# Patient Record
Sex: MALE | Race: WHITE | Employment: FULL TIME | URBAN - METROPOLITAN AREA
[De-identification: names, ages, dates, MRNs, and addresses within clinical notes are randomized per-mention and may not be internally consistent; named-entity substitution may affect disease eponyms.]

---

## 2018-09-24 ENCOUNTER — HOSPITAL ENCOUNTER (OUTPATIENT)
Dept: PSYCHIATRY | Age: 28
Setting detail: THERAPIES SERIES
Discharge: HOME | End: 2018-09-24
Payer: COMMERCIAL

## 2018-09-24 PROCEDURE — 80305 DRUG TEST PRSMV DIR OPT OBS: CPT

## 2018-09-24 PROCEDURE — 90834 PSYTX W PT 45 MINUTES: CPT

## 2018-10-01 ENCOUNTER — HOSPITAL ENCOUNTER (OUTPATIENT)
Dept: PSYCHIATRY | Age: 28
Setting detail: THERAPIES SERIES
Discharge: HOME | End: 2018-10-01
Payer: COMMERCIAL

## 2018-10-01 ENCOUNTER — APPOINTMENT (OUTPATIENT)
Dept: PSYCHIATRY | Age: 28
End: 2018-10-01
Payer: COMMERCIAL

## 2018-10-01 PROCEDURE — 90834 PSYTX W PT 45 MINUTES: CPT

## 2018-10-11 ENCOUNTER — HOSPITAL ENCOUNTER (OUTPATIENT)
Dept: PSYCHIATRY | Age: 28
Setting detail: THERAPIES SERIES
Discharge: HOME | End: 2018-10-11
Payer: COMMERCIAL

## 2018-10-11 PROCEDURE — 90853 GROUP PSYCHOTHERAPY: CPT

## 2018-10-15 ENCOUNTER — APPOINTMENT (OUTPATIENT)
Dept: PSYCHIATRY | Age: 28
End: 2018-10-15
Payer: COMMERCIAL

## 2018-10-18 ENCOUNTER — APPOINTMENT (OUTPATIENT)
Dept: PSYCHIATRY | Age: 28
End: 2018-10-18
Payer: COMMERCIAL

## 2018-10-22 ENCOUNTER — APPOINTMENT (OUTPATIENT)
Dept: PSYCHIATRY | Age: 28
End: 2018-10-22
Payer: COMMERCIAL

## 2018-10-23 ENCOUNTER — APPOINTMENT (OUTPATIENT)
Dept: PSYCHIATRY | Age: 28
End: 2018-10-23
Payer: COMMERCIAL

## 2018-10-25 ENCOUNTER — APPOINTMENT (OUTPATIENT)
Dept: PSYCHIATRY | Age: 28
End: 2018-10-25
Payer: COMMERCIAL

## 2018-10-29 ENCOUNTER — APPOINTMENT (OUTPATIENT)
Dept: PSYCHIATRY | Age: 28
End: 2018-10-29
Payer: COMMERCIAL

## 2018-10-30 ENCOUNTER — APPOINTMENT (OUTPATIENT)
Dept: PSYCHIATRY | Age: 28
End: 2018-10-30
Payer: COMMERCIAL

## 2020-03-10 ENCOUNTER — APPOINTMENT (OUTPATIENT)
Dept: GENERAL RADIOLOGY | Age: 30
DRG: 816 | End: 2020-03-10
Payer: COMMERCIAL

## 2020-03-10 ENCOUNTER — HOSPITAL ENCOUNTER (INPATIENT)
Age: 30
LOS: 1 days | Discharge: LEFT BEFORE DISCHARGE | DRG: 816 | End: 2020-03-11
Attending: EMERGENCY MEDICINE | Admitting: INTERNAL MEDICINE
Payer: COMMERCIAL

## 2020-03-10 LAB
BASOPHILS ABSOLUTE: 0 K/CU MM
BASOPHILS RELATIVE PERCENT: 0.2 % (ref 0–1)
DIFFERENTIAL TYPE: ABNORMAL
EOSINOPHILS ABSOLUTE: 0 K/CU MM
EOSINOPHILS RELATIVE PERCENT: 0.1 % (ref 0–3)
HCT VFR BLD CALC: 42.2 % (ref 42–52)
HEMOGLOBIN: 14 GM/DL (ref 13.5–18)
IMMATURE NEUTROPHIL %: 0.4 % (ref 0–0.43)
LACTATE: 1.9 MMOL/L (ref 0.4–2)
LYMPHOCYTES ABSOLUTE: 0.6 K/CU MM
LYMPHOCYTES RELATIVE PERCENT: 7 % (ref 24–44)
MCH RBC QN AUTO: 27.5 PG (ref 27–31)
MCHC RBC AUTO-ENTMCNC: 33.2 % (ref 32–36)
MCV RBC AUTO: 82.9 FL (ref 78–100)
MONOCYTES ABSOLUTE: 0.8 K/CU MM
MONOCYTES RELATIVE PERCENT: 9.3 % (ref 0–4)
NUCLEATED RBC %: 0 %
PDW BLD-RTO: 14.2 % (ref 11.7–14.9)
PLATELET # BLD: 189 K/CU MM (ref 140–440)
PMV BLD AUTO: 10.1 FL (ref 7.5–11.1)
RBC # BLD: 5.09 M/CU MM (ref 4.6–6.2)
SEGMENTED NEUTROPHILS ABSOLUTE COUNT: 7 K/CU MM
SEGMENTED NEUTROPHILS RELATIVE PERCENT: 83 % (ref 36–66)
TOTAL IMMATURE NEUTOROPHIL: 0.03 K/CU MM
TOTAL NUCLEATED RBC: 0 K/CU MM
WBC # BLD: 8.5 K/CU MM (ref 4–10.5)

## 2020-03-10 PROCEDURE — 83880 ASSAY OF NATRIURETIC PEPTIDE: CPT

## 2020-03-10 PROCEDURE — 84484 ASSAY OF TROPONIN QUANT: CPT

## 2020-03-10 PROCEDURE — 87633 RESP VIRUS 12-25 TARGETS: CPT

## 2020-03-10 PROCEDURE — 71045 X-RAY EXAM CHEST 1 VIEW: CPT

## 2020-03-10 PROCEDURE — 87798 DETECT AGENT NOS DNA AMP: CPT

## 2020-03-10 PROCEDURE — 6370000000 HC RX 637 (ALT 250 FOR IP): Performed by: EMERGENCY MEDICINE

## 2020-03-10 PROCEDURE — 87581 M.PNEUMON DNA AMP PROBE: CPT

## 2020-03-10 PROCEDURE — 83605 ASSAY OF LACTIC ACID: CPT

## 2020-03-10 PROCEDURE — 2580000003 HC RX 258: Performed by: EMERGENCY MEDICINE

## 2020-03-10 PROCEDURE — 80053 COMPREHEN METABOLIC PANEL: CPT

## 2020-03-10 PROCEDURE — 87040 BLOOD CULTURE FOR BACTERIA: CPT

## 2020-03-10 PROCEDURE — 85025 COMPLETE CBC W/AUTO DIFF WBC: CPT

## 2020-03-10 PROCEDURE — 6360000002 HC RX W HCPCS: Performed by: EMERGENCY MEDICINE

## 2020-03-10 PROCEDURE — 99291 CRITICAL CARE FIRST HOUR: CPT

## 2020-03-10 PROCEDURE — 93005 ELECTROCARDIOGRAM TRACING: CPT | Performed by: EMERGENCY MEDICINE

## 2020-03-10 PROCEDURE — 96375 TX/PRO/DX INJ NEW DRUG ADDON: CPT

## 2020-03-10 PROCEDURE — 87486 CHLMYD PNEUM DNA AMP PROBE: CPT

## 2020-03-10 PROCEDURE — 82805 BLOOD GASES W/O2 SATURATION: CPT

## 2020-03-10 RX ORDER — SODIUM CHLORIDE 9 MG/ML
INJECTION, SOLUTION INTRAVENOUS
Status: DISPENSED
Start: 2020-03-10 | End: 2020-03-11

## 2020-03-10 RX ORDER — 0.9 % SODIUM CHLORIDE 0.9 %
30 INTRAVENOUS SOLUTION INTRAVENOUS ONCE
Status: COMPLETED | OUTPATIENT
Start: 2020-03-10 | End: 2020-03-11

## 2020-03-10 RX ORDER — ACETAMINOPHEN 500 MG
1000 TABLET ORAL ONCE
Status: COMPLETED | OUTPATIENT
Start: 2020-03-10 | End: 2020-03-10

## 2020-03-10 RX ORDER — NALOXONE HYDROCHLORIDE 1 MG/ML
2 INJECTION INTRAMUSCULAR; INTRAVENOUS; SUBCUTANEOUS ONCE
Status: COMPLETED | OUTPATIENT
Start: 2020-03-10 | End: 2020-03-10

## 2020-03-10 RX ADMIN — NALOXONE HYDROCHLORIDE 2 MG: 1 INJECTION PARENTERAL at 23:05

## 2020-03-10 RX ADMIN — ACETAMINOPHEN 1000 MG: 500 TABLET ORAL at 23:06

## 2020-03-10 RX ADMIN — SODIUM CHLORIDE 2448 ML: 9 INJECTION, SOLUTION INTRAVENOUS at 23:05

## 2020-03-10 ASSESSMENT — PAIN SCALES - GENERAL: PAINLEVEL_OUTOF10: 0

## 2020-03-11 VITALS
SYSTOLIC BLOOD PRESSURE: 130 MMHG | DIASTOLIC BLOOD PRESSURE: 92 MMHG | OXYGEN SATURATION: 100 % | RESPIRATION RATE: 20 BRPM | HEART RATE: 71 BPM | TEMPERATURE: 98.6 F | HEIGHT: 67 IN | BODY MASS INDEX: 25.02 KG/M2 | WEIGHT: 159.39 LBS

## 2020-03-11 PROBLEM — T50.901A DRUG OVERDOSE, ACCIDENTAL OR UNINTENTIONAL, INITIAL ENCOUNTER: Status: ACTIVE | Noted: 2020-03-11

## 2020-03-11 LAB
ADENOVIRUS DETECTION BY PCR: NOT DETECTED
ALBUMIN SERPL-MCNC: 4.5 GM/DL (ref 3.4–5)
ALP BLD-CCNC: 43 IU/L (ref 40–129)
ALT SERPL-CCNC: 14 U/L (ref 10–40)
ANION GAP SERPL CALCULATED.3IONS-SCNC: 11 MMOL/L (ref 4–16)
ANION GAP SERPL CALCULATED.3IONS-SCNC: 9 MMOL/L (ref 4–16)
AST SERPL-CCNC: 27 IU/L (ref 15–37)
BASE EXCESS: ABNORMAL (ref 0–3.3)
BASOPHILS ABSOLUTE: 0 K/CU MM
BASOPHILS RELATIVE PERCENT: 0.3 % (ref 0–1)
BILIRUB SERPL-MCNC: 0.2 MG/DL (ref 0–1)
BORDETELLA PERTUSSIS PCR: NOT DETECTED
BUN BLDV-MCNC: 15 MG/DL (ref 6–23)
BUN BLDV-MCNC: 9 MG/DL (ref 6–23)
CALCIUM SERPL-MCNC: 8.1 MG/DL (ref 8.3–10.6)
CALCIUM SERPL-MCNC: 9.1 MG/DL (ref 8.3–10.6)
CHLAMYDOPHILA PNEUMONIA PCR: NOT DETECTED
CHLORIDE BLD-SCNC: 106 MMOL/L (ref 99–110)
CHLORIDE BLD-SCNC: 99 MMOL/L (ref 99–110)
CO2: 20 MMOL/L (ref 21–32)
CO2: 23 MMOL/L (ref 21–32)
COMMENT: ABNORMAL
CORONAVIRUS 229E PCR: NOT DETECTED
CORONAVIRUS HKU1 PCR: NOT DETECTED
CORONAVIRUS NL63 PCR: NOT DETECTED
CORONAVIRUS OC43 PCR: NOT DETECTED
CREAT SERPL-MCNC: 1.1 MG/DL (ref 0.9–1.3)
CREAT SERPL-MCNC: 1.2 MG/DL (ref 0.9–1.3)
DIFFERENTIAL TYPE: ABNORMAL
EOSINOPHILS ABSOLUTE: 0 K/CU MM
EOSINOPHILS RELATIVE PERCENT: 0.2 % (ref 0–3)
GFR AFRICAN AMERICAN: >60 ML/MIN/1.73M2
GFR AFRICAN AMERICAN: >60 ML/MIN/1.73M2
GFR NON-AFRICAN AMERICAN: >60 ML/MIN/1.73M2
GFR NON-AFRICAN AMERICAN: >60 ML/MIN/1.73M2
GLUCOSE BLD-MCNC: 93 MG/DL (ref 70–99)
GLUCOSE BLD-MCNC: 98 MG/DL (ref 70–99)
HCO3 VENOUS: 21.5 MMOL/L (ref 19–25)
HCT VFR BLD CALC: 39 % (ref 42–52)
HEMOGLOBIN: 12.7 GM/DL (ref 13.5–18)
HUMAN METAPNEUMOVIRUS PCR: NOT DETECTED
IMMATURE NEUTROPHIL %: 0.2 % (ref 0–0.43)
INFLUENZA A BY PCR: NOT DETECTED
INFLUENZA A H1 (2009) PCR: NOT DETECTED
INFLUENZA A H1 PANDEMIC PCR: NOT DETECTED
INFLUENZA A H3 PCR: NOT DETECTED
INFLUENZA B BY PCR: NOT DETECTED
LYMPHOCYTES ABSOLUTE: 1.2 K/CU MM
LYMPHOCYTES RELATIVE PERCENT: 13.9 % (ref 24–44)
MCH RBC QN AUTO: 27.5 PG (ref 27–31)
MCHC RBC AUTO-ENTMCNC: 32.6 % (ref 32–36)
MCV RBC AUTO: 84.4 FL (ref 78–100)
MONOCYTES ABSOLUTE: 1.3 K/CU MM
MONOCYTES RELATIVE PERCENT: 14.1 % (ref 0–4)
MYCOPLASMA PNEUMONIAE PCR: NOT DETECTED
NUCLEATED RBC %: 0 %
O2 SAT, VEN: 94.4 % (ref 50–70)
PARAINFLUENZA 1 PCR: NOT DETECTED
PARAINFLUENZA 2 PCR: NOT DETECTED
PARAINFLUENZA 3 PCR: NOT DETECTED
PARAINFLUENZA 4 PCR: NOT DETECTED
PCO2, VEN: 39 MMHG (ref 38–52)
PDW BLD-RTO: 14.6 % (ref 11.7–14.9)
PH VENOUS: 7.35 (ref 7.32–7.42)
PLATELET # BLD: 167 K/CU MM (ref 140–440)
PMV BLD AUTO: 10.5 FL (ref 7.5–11.1)
PO2, VEN: 160 MMHG (ref 28–48)
POTASSIUM SERPL-SCNC: 4 MMOL/L (ref 3.5–5.1)
POTASSIUM SERPL-SCNC: 4.4 MMOL/L (ref 3.5–5.1)
PRO-BNP: 24.22 PG/ML
RBC # BLD: 4.62 M/CU MM (ref 4.6–6.2)
RHINOVIRUS ENTEROVIRUS PCR: NOT DETECTED
RSV PCR: NOT DETECTED
SEGMENTED NEUTROPHILS ABSOLUTE COUNT: 6.3 K/CU MM
SEGMENTED NEUTROPHILS RELATIVE PERCENT: 71.3 % (ref 36–66)
SODIUM BLD-SCNC: 133 MMOL/L (ref 135–145)
SODIUM BLD-SCNC: 135 MMOL/L (ref 135–145)
TOTAL IMMATURE NEUTOROPHIL: 0.02 K/CU MM
TOTAL NUCLEATED RBC: 0 K/CU MM
TOTAL PROTEIN: 7.1 GM/DL (ref 6.4–8.2)
TOTAL RETICULOCYTE COUNT: 0.03 K/CU MM
TROPONIN T: <0.01 NG/ML
WBC # BLD: 8.8 K/CU MM (ref 4–10.5)

## 2020-03-11 PROCEDURE — 6360000002 HC RX W HCPCS: Performed by: INTERNAL MEDICINE

## 2020-03-11 PROCEDURE — 82805 BLOOD GASES W/O2 SATURATION: CPT

## 2020-03-11 PROCEDURE — 93010 ELECTROCARDIOGRAM REPORT: CPT | Performed by: INTERNAL MEDICINE

## 2020-03-11 PROCEDURE — 96368 THER/DIAG CONCURRENT INF: CPT

## 2020-03-11 PROCEDURE — 6360000002 HC RX W HCPCS: Performed by: EMERGENCY MEDICINE

## 2020-03-11 PROCEDURE — 80048 BASIC METABOLIC PNL TOTAL CA: CPT

## 2020-03-11 PROCEDURE — 87040 BLOOD CULTURE FOR BACTERIA: CPT

## 2020-03-11 PROCEDURE — 96375 TX/PRO/DX INJ NEW DRUG ADDON: CPT

## 2020-03-11 PROCEDURE — 87081 CULTURE SCREEN ONLY: CPT

## 2020-03-11 PROCEDURE — 96366 THER/PROPH/DIAG IV INF ADDON: CPT

## 2020-03-11 PROCEDURE — 96365 THER/PROPH/DIAG IV INF INIT: CPT

## 2020-03-11 PROCEDURE — 85025 COMPLETE CBC W/AUTO DIFF WBC: CPT

## 2020-03-11 PROCEDURE — 87449 NOS EACH ORGANISM AG IA: CPT

## 2020-03-11 PROCEDURE — 2580000003 HC RX 258: Performed by: INTERNAL MEDICINE

## 2020-03-11 PROCEDURE — 36415 COLL VENOUS BLD VENIPUNCTURE: CPT

## 2020-03-11 PROCEDURE — 96376 TX/PRO/DX INJ SAME DRUG ADON: CPT

## 2020-03-11 PROCEDURE — 2060000000 HC ICU INTERMEDIATE R&B

## 2020-03-11 PROCEDURE — 2580000003 HC RX 258: Performed by: EMERGENCY MEDICINE

## 2020-03-11 PROCEDURE — 96367 TX/PROPH/DG ADDL SEQ IV INF: CPT

## 2020-03-11 RX ORDER — NALOXONE HYDROCHLORIDE 1 MG/ML
2 INJECTION INTRAMUSCULAR; INTRAVENOUS; SUBCUTANEOUS ONCE
Status: COMPLETED | OUTPATIENT
Start: 2020-03-11 | End: 2020-03-11

## 2020-03-11 RX ORDER — ONDANSETRON 2 MG/ML
4 INJECTION INTRAMUSCULAR; INTRAVENOUS EVERY 6 HOURS PRN
Status: DISCONTINUED | OUTPATIENT
Start: 2020-03-11 | End: 2020-03-11 | Stop reason: HOSPADM

## 2020-03-11 RX ORDER — ACETAMINOPHEN 650 MG/1
650 SUPPOSITORY RECTAL EVERY 6 HOURS PRN
Status: DISCONTINUED | OUTPATIENT
Start: 2020-03-11 | End: 2020-03-11 | Stop reason: HOSPADM

## 2020-03-11 RX ORDER — ACETAMINOPHEN 325 MG/1
650 TABLET ORAL EVERY 6 HOURS PRN
Status: DISCONTINUED | OUTPATIENT
Start: 2020-03-11 | End: 2020-03-11 | Stop reason: HOSPADM

## 2020-03-11 RX ORDER — ONDANSETRON 2 MG/ML
4 INJECTION INTRAMUSCULAR; INTRAVENOUS EVERY 6 HOURS PRN
Status: DISCONTINUED | OUTPATIENT
Start: 2020-03-11 | End: 2020-03-11 | Stop reason: SDUPTHER

## 2020-03-11 RX ORDER — PROMETHAZINE HYDROCHLORIDE 25 MG/1
12.5 TABLET ORAL EVERY 6 HOURS PRN
Status: DISCONTINUED | OUTPATIENT
Start: 2020-03-11 | End: 2020-03-11 | Stop reason: HOSPADM

## 2020-03-11 RX ORDER — POLYETHYLENE GLYCOL 3350 17 G/17G
17 POWDER, FOR SOLUTION ORAL DAILY PRN
Status: DISCONTINUED | OUTPATIENT
Start: 2020-03-11 | End: 2020-03-11 | Stop reason: HOSPADM

## 2020-03-11 RX ORDER — SODIUM CHLORIDE 9 MG/ML
INJECTION, SOLUTION INTRAVENOUS CONTINUOUS
Status: DISCONTINUED | OUTPATIENT
Start: 2020-03-11 | End: 2020-03-11 | Stop reason: HOSPADM

## 2020-03-11 RX ORDER — SODIUM CHLORIDE 0.9 % (FLUSH) 0.9 %
10 SYRINGE (ML) INJECTION PRN
Status: DISCONTINUED | OUTPATIENT
Start: 2020-03-11 | End: 2020-03-11 | Stop reason: HOSPADM

## 2020-03-11 RX ORDER — SODIUM CHLORIDE 0.9 % (FLUSH) 0.9 %
10 SYRINGE (ML) INJECTION EVERY 12 HOURS SCHEDULED
Status: DISCONTINUED | OUTPATIENT
Start: 2020-03-11 | End: 2020-03-11 | Stop reason: HOSPADM

## 2020-03-11 RX ADMIN — NALOXONE HYDROCHLORIDE 1 MG/HR: 1 INJECTION PARENTERAL at 07:01

## 2020-03-11 RX ADMIN — AZITHROMYCIN MONOHYDRATE 500 MG: 500 INJECTION, POWDER, LYOPHILIZED, FOR SOLUTION INTRAVENOUS at 00:46

## 2020-03-11 RX ADMIN — VANCOMYCIN HYDROCHLORIDE 1250 MG: 5 INJECTION, POWDER, LYOPHILIZED, FOR SOLUTION INTRAVENOUS at 01:06

## 2020-03-11 RX ADMIN — NALOXONE HYDROCHLORIDE 1 MG/HR: 1 INJECTION PARENTERAL at 01:19

## 2020-03-11 RX ADMIN — SODIUM CHLORIDE: 9 INJECTION, SOLUTION INTRAVENOUS at 05:54

## 2020-03-11 RX ADMIN — ONDANSETRON 4 MG: 2 INJECTION INTRAMUSCULAR; INTRAVENOUS at 01:15

## 2020-03-11 RX ADMIN — NALOXONE HYDROCHLORIDE 2 MG: 1 INJECTION PARENTERAL at 00:24

## 2020-03-11 RX ADMIN — CEFEPIME 2 G: 2 INJECTION, POWDER, FOR SOLUTION INTRAVENOUS at 00:21

## 2020-03-11 RX ADMIN — VANCOMYCIN HYDROCHLORIDE 1250 MG: 5 INJECTION, POWDER, LYOPHILIZED, FOR SOLUTION INTRAVENOUS at 12:36

## 2020-03-11 RX ADMIN — NALOXONE HYDROCHLORIDE 1 MG/HR: 1 INJECTION PARENTERAL at 11:13

## 2020-03-11 ASSESSMENT — PAIN SCALES - GENERAL
PAINLEVEL_OUTOF10: 0

## 2020-03-11 NOTE — ED NOTES
Bed: ED-14  Expected date:   Expected time:   Means of arrival:   Comments:  Moving MA from room [address] [facility] Zac David Gallegos RN  03/11/20 9281

## 2020-03-11 NOTE — ED NOTES
Patient placed on cardiac monitor and 2L of oxygen. Patient oxygen decreased to 88% while sleeping.      Dwain Vela RN  03/10/20 0106

## 2020-03-11 NOTE — ED NOTES
Patient from home with accidental over dose. EMS gave 6 MG IV and 2 MG IM in route vital signs stable respirations even and unlabored.       Dioni Whaley RN  03/10/20 [id number]

## 2020-03-11 NOTE — H&P
HISTORY AND PHYSICAL  (Hospitalist, Internal Medicine)  IDENTIFYING INFORMATION   PATIENT:  Andrew Juarez  MRN:  [id number]  ADMIT DATE: 3/10/2020      CHIEF COMPLAINT   Drug overdose    HISTORY OF PRESENT ILLNESS   Andrew Juarez is a 30 y.o. male with no significant past medical history, polysubstance abuse was brought to ER by EMS after the patient was found by his girlfriend. Patient was drowsy, unable to get much information from him. Patient knew that he was in the hospital, name of the hospital, year, month, date. Entire information was from patient's girlfriend at bedside. She states that she found the patient with agonal breathing when she returned from work. She said that the patient was using cocaine and heroin. She reports that he was doing heroin in the past and she was not aware that he started using it again. Patient had multiple incidents of overdose in the past.  Patient currently denied any complaints-no chest pain, shortness of breath, abdominal pain, denied any diarrhea. Patient was noted to be saturating 88% and was placed on supplemental oxygen. Patient was saturating 99% on 4 L of oxygen at the time of my interview. Patient received 2 mg of IM, 6 mg of IV Narcan by EMS with improvement in his mental status. Patient's girlfriend also stated that patient was having cough with productive sputum. At presentation patient was noted to have /106, , RR 20, temperature 100.6, saturating 94%. As per ER physician patient was requiring frequent doses of Narcan and hence a continuous infusion was started. Patient was also noted to have epistaxis in the ER which resolved. Labs within normal limits. Chest x-ray-reported as streaky bilateral infrahilar airspace opacities, which could represent an atypical or viral pneumonitis. Patient received vancomycin, cefepime, azithromycin.     PAST MEDICAL HISTORY PAST SURGICAL HISTORY   None significant  none   FAMILY HISTORY SOCIAL HISTORY    Unknown as per patient's girlfriend   patient smokes cigarettes-half pack per day, denies any alcohol, cocaine and heroin abuse   MEDICATIONS ALLERGIES    None   no known drug allergies       PAST MEDICAL, SURGICAL, FAMILY, and SOCIAL HISTORY         No past medical history on file. No past surgical history on file. No family history on file. Family Hx of HTN  Family Hx as reviewed above, otherwise non-contributory  Social History     Socioeconomic History    Marital status: Single     Spouse name: Not on file    Number of children: Not on file    Years of education: Not on file    Highest education level: Not on file   Occupational History    Not on file   Social Needs    Financial resource strain: Not on file    Food insecurity     Worry: Not on file     Inability: Not on file    Transportation needs     Medical: Not on file     Non-medical: Not on file   Tobacco Use    Smoking status: Current Every Day Smoker   Substance and Sexual Activity    Alcohol use: Yes    Drug use: Not on file     Comment:  heroin    Sexual activity: Not on file   Lifestyle    Physical activity     Days per week: Not on file     Minutes per session: Not on file    Stress: Not on file   Relationships    Social connections     Talks on phone: Not on file     Gets together: Not on file     Attends [employer] service: Not on file     Active member of club or organization: Not on file     Attends meetings of clubs or organizations: Not on file     Relationship status: Not on file    Intimate partner violence     Fear of current or ex partner: Not on file     Emotionally abused: Not on file     Physically abused: Not on file     Forced sexual activity: Not on file   Other Topics Concern    Not on file   Social History Narrative    Not on file       MEDICATIONS   Medications Prior to Admission  Not in a hospital admission.     Current Medications  Current Facility-Administered Medications   Medication Dose Route Frequency Provider Last Rate Last Dose    naloxone (NARCAN) 2 mg in sodium chloride 0.9 % 500 mL infusion  1 mg/hr Intravenous Continuous Vianney Carreon  mL/hr at 03/11/20 0119 1 mg/hr at 03/11/20 0119    ondansetron (ZOFRAN) injection 4 mg  4 mg Intravenous Q6H PRN Vianney Carreon MD   4 mg at 03/11/20 0115     Current Outpatient Medications   Medication Sig Dispense Refill    ondansetron (ZOFRAN) 4 MG tablet Take 1 tablet by mouth every 8 hours as needed for Nausea or Vomiting 30 tablet 0         Allergies  No Known Allergies    REVIEW OF SYSTEMS   Within above limitations. 14 point review of systems reviewed. Pertinent positive or negative as per HPI or otherwise negative per 14 point systems review. PHYSICAL EXAM     Wt Readings from Last 3 Encounters:   03/11/20 180 lb (81.6 kg)   09/06/16 170 lb (77.1 kg)   09/05/16 170 lb (77.1 kg)       Blood pressure 113/79, pulse 108, temperature 100.6 °F (38.1 °C), temperature source Axillary, resp. rate 16, height 5' 8\" (1.727 m), weight 180 lb (81.6 kg), SpO2 100 %. GEN  -drowsy, arousable, answering questions appropriately  EYES   -PERRL. HENT  -MM are moist.  Clotted blood in bilateral nares. RESP  -LS CTA equal bilat, no wheezes, rales or rhonchi. Symmetric chest movement. No respiratory distress noted. C/V  -S1/S2 auscultated, tachycardia without appreciable M/R/G. No JVD or carotid bruits. Peripheral pulses equal bilaterally and palpable. No peripheral edema. No reproducible chest wall tenderness. GI  -Abdomen is soft non-distended, no significant tenderness. No masses or guarding. + BS in all quadrants. Rectal exam deferred.   -No CVA tenderness. Ochoa catheter is not present. MS  -B/L extremities strong muscles strength. Full movements. No gross joint deformities. No swelling, intact sensation symmetrical.   SKIN  -Normal coloration, warm, dry. NEURO  -CN 2-12 appear grossly intact, normal speech, no lateralizing weakness.   PSYC-  drowsy, arousable, oriented x 4. Appropriate affect. LABS AND IMAGING     Results for Marcus Hill (MRN [id number]) as of 3/11/2020 04:41   Ref.  Range 3/10/2020 23:20   Sodium Latest Ref Range: 135 - 145 MMOL/L 133 (L)   Potassium Latest Ref Range: 3.5 - 5.1 MMOL/L 4.0   Chloride Latest Ref Range: 99 - 110 mMol/L 99   CO2 Latest Ref Range: 21 - 32 MMOL/L 23   BUN Latest Ref Range: 6 - 23 MG/DL 15   Creatinine Latest Ref Range: 0.9 - 1.3 MG/DL 1.2   Anion Gap Latest Ref Range: 4 - 16  11   GFR Non- Latest Ref Range: >60 mL/min/1.73m2 >60   GFR African American Latest Ref Range: >60 mL/min/1.73m2 >60   Lactate, ser/plas Latest Ref Range: 0.4 - 2.0 mMOL/L 1.9   Glucose Latest Ref Range: 70 - 99 MG/DL 98   Calcium Latest Ref Range: 8.3 - 10.6 MG/DL 9.1   Total Protein Latest Ref Range: 6.4 - 8.2 GM/DL 7.1   Pro-BNP Latest Ref Range: <300 PG/ML 24.22   Troponin T Latest Ref Range: <0.01 NG/ML <0.010   Albumin Latest Ref Range: 3.4 - 5.0 GM/DL 4.5   Alk Phos Latest Ref Range: 40 - 129 IU/L 43   ALT Latest Ref Range: 10 - 40 U/L 14   AST Latest Ref Range: 15 - 37 IU/L 27   Bilirubin Latest Ref Range: 0.0 - 1.0 MG/DL 0.2   WBC Latest Ref Range: 4.0 - 10.5 K/CU MM 8.5   RBC Latest Ref Range: 4.6 - 6.2 M/CU MM 5.09   Hemoglobin Quant Latest Ref Range: 13.5 - 18.0 GM/DL 14.0   Hematocrit Latest Ref Range: 42 - 52 % 42.2   MCV Latest Ref Range: 78 - 100 FL 82.9   MCH Latest Ref Range: 27 - 31 PG 27.5   MCHC Latest Ref Range: 32.0 - 36.0 % 33.2   MPV Latest Ref Range: 7.5 - 11.1 FL 10.1   RDW Latest Ref Range: 11.7 - 14.9 % 14.2   Platelet Count Latest Ref Range: 140 - 440 K/CU    Lymphocyte % Latest Ref Range: 24 - 44 % 7.0 (L)   Monocytes % Latest Ref Range: 0 - 4 % 9.3 (H)   Eosinophils % Latest Ref Range: 0 - 3 % 0.1   Basophils % Latest Ref Range: 0 - 1 % 0.2   Lymphocytes Absolute Latest Units: K/CU MM 0.6   Monocytes Absolute Latest Units: K/CU MM 0.8   Eosinophils Absolute Latest Units: K/CU MM Order Status: Completed Updated: 03/11/20 0002     Impression:       Streaky, bilateral infrahilar airspace opacities, which could represent an  atypical or viral pneumonitis.  Follow-up recommended to assure resolution. Relevant labs and imaging reviewed    ASSESSMENT AND PLAN     1. Acute hypoxic respiratory failure with hypoxia:  -patient was saturating 88% as per ER documentation  - patient was saturating 99% on 4 L of oxygen during my interview.   -Chest x-ray reported as streaky bilateral infrahilar airspace opacities which could represent an atypical or viral pneumonitis.  -Patient also had low-grade fever at 100.6.  -Continue oxygen supplementation and wean as tolerated. -Continue treatment for pneumonia.  -Respiratory panel negative. -Urine Legionella, Streptococcus pneumonia antigen ordered. Sputum culture ordered.  -Blood cultures ordered in the ER.  -Consult pulmonology as needed. 2.  Accidental drug overdose:  -Patient does use IV heroin and cocaine.  -Patient currently on Narcan drip.  -Reevaluate in the morning. 3.  Epistaxis: Resolved    DVT Prophylaxis:lovenox  GI Prophylaxis: not indicated  Code Status: FULL.       Case d/w ED physician    Virginia Harrison MD  Hospitalist, Internal Medicine  3/11/2020 at 3:30 AM

## 2020-03-11 NOTE — ED PROVIDER NOTES
induration. No underlying fluctuance. Erythema does not involve the joints. Eye:  Extraocular movements intact. Ears, nose, mouth and throat:  Oral mucosa moist   Neck:  Trachea midline. Extremity:  No swelling. Normal ROM     Heart: Tachycardic but regular, normal S1 & S2, no extra heart sounds. No murmur heard. Perfusion:  Intact. Bounding peripheral pulses. Respiratory: Coarse breath sounds bilateral bases. Mild bradypnea. Speaking in short sentences. No overt respiratory distress. Protecting airway. Abdominal:  Normal bowel sounds. Soft. Nontender. Non distended. Back:  No CVA tenderness to palpation     Neurological:  Alert but drowsy. Intermittently answering questions. Patient does follow commands.           Psychiatric:  Appropriate    I have reviewed and interpreted all of the currently available lab results from this visit (if applicable):  Results for orders placed or performed during the hospital encounter of 03/10/20   RESP Disease Panel PCR   Result Value Ref Range    Adenovirus Detection by PCR NOT DETECTED NOT DETECTED    Coronavirus 229E PCR NOT DETECTED NOT DETECTED    Coronavirus HKU1 PCR NOT DETECTED NOT DETECTED    Coronavirus NL63 PCR NOT DETECTED NOT DETECTED    Coronavirus OC43 PCR NOT DETECTED NOT DETECTED    Human Metapneumovirus PCR NOT DETECTED NOT DETECTED    Rhinovirus Enterovirus PCR NOT DETECTED NOT DETECTED    Influenza A by PCR NOT DETECTED NOT DETECTED    Influenza A H1 (2009) PCR NOT DETECTED NOT DETECTED    Influenza A H1 Pandemic PCR NOT DETECTED NOT DETECTED    Influenza A H3 PCR NOT DETECTED NOT DETECTED    Influenza B by PCR NOT DETECTED NOT DETECTED    Parainfluenza 1 PCR NOT DETECTED NOT DETECTED    Parainfluenza 2 PCR NOT DETECTED NOT DETECTED    Parainfluenza 3 PCR NOT DETECTED NOT DETECTED    Parainfluenza 4 PCR NOT DETECTED NOT DETECTED    RSV PCR NOT DETECTED NOT DETECTED    B Pertussis by PCR NOT DETECTED NOT DETECTED    Chlamydophila Pneumonia R Axis 73 degrees    T Axis 34 degrees    Diagnosis       Sinus tachycardia  Otherwise normal ECG  No previous ECGs available        Radiographs (if obtained):  [] The following radiograph was interpreted by myself in the absence of a radiologist:   [x] Radiologist's Report Reviewed:  XR CHEST PORTABLE   Preliminary Result   Streaky, bilateral infrahilar airspace opacities, which could represent an   atypical or viral pneumonitis. Follow-up recommended to assure resolution. EKG (if obtained): (All EKG's are interpreted by myself in the absence of a cardiologist)    Chart review shows recent radiographs:  Xr Chest Portable    Result Date: 3/11/2020  Streaky, bilateral infrahilar airspace opacities, which could represent an atypical or viral pneumonitis. Follow-up recommended to assure resolution. MDM:  Pt presents as above. Emergent conditions considered. Presentation prompted initial labs, EKG and imaging. EKG with sinus tachycardia as above. CBC and CMP without clinically significant derangement. Troponin negative. BNP is not suggestive of volume overload. Chest x-ray is demonstrating streaky bilateral airspace opacities. Blood cultures sent. Lactate is not suggestive of significant tissue hypoperfusion. Respiratory disease panel is negative. Patient is receiving IV fluids of 30 cc/kg bolus on arrival.  Oral Tylenol was given. IV antibiotics of broad-spectrum were initiated of cefepime, vancomycin and azithromycin given patient's IV drug abuse. Patient is requiring repeated doses of Narcan boluses in the emergency department and a Narcan infusion was initiated. Patient is easily arousable on reassessment at 0130 and is verbal.  Patient is protecting his airway does not need to be intubated. Patient clearly benefit from admission until he is off of oxygen and not requiring further Narcan.   Patient is to be discussed with hospitalist team and patient is to be admitted to

## 2020-03-11 NOTE — ED NOTES
Patient found vomiting in trash can beside of bed. Bright blood coming form nose. Face washed with towel.      Millie Joseph RN  03/11/20 010

## 2020-03-11 NOTE — PROGRESS NOTES
RENAL DOSE ADJUSTMENT MADE PER P/T PROTOCOL    PREVIOUS ORDER:  Cefepime 1g q8h IVPB     Estimated Creatinine Clearance: 83 mL/min (based on SCr of 1.2 mg/dL).   Recent Labs     03/10/20  2320   BUN 15   CREATININE 1.2        NEW RENALLY ADJUSTED ORDER:  Cefepime 2g q8h IVPB recommended dose for coverage of pneumonia with CrCl >60     CECILLE Dale [facility]  3/11/2020 8:48 AM  __________________________________________________

## 2020-03-11 NOTE — PROGRESS NOTES
[facility] HOSPITALIST PROGRESS NOTE  Date: 3/11/2020   Name: Harland Denver   MRN: [id number]   YOB: 1990  Chief Complaint   Patient presents with    Drug Overdose     6mg IV and 2mg IM narcan given         Subjective/Interval Hx:     -OD yesterday night around 7 or 8pm      ROS: negative unless mentioned above  Objective:   Physical Exam:   /68   Pulse 71   Temp 98 °F (36.7 °C) (Oral)   Resp 20   Ht 5' 7\" (1.702 m)   Wt 159 lb 6.3 oz (72.3 kg)   SpO2 100%   BMI 24.96 kg/m²   CONSTITUTIONAL:  No acute distress, sleeping, can be aroused  HENT:  NCAT  LUNGS:  cta b/l bs+  CARDIOVASCULAR:  s1s2 rrr  ABDOMEN:  Soft nt nd  MUSCULOSKELETAL/Extremities:  No edema, nontender  NEUROLOGIC:  Able to be arouse, but quickly falls asleep  SKIN:  May have track yony in right arm    Assessment/Plan:       1. Acute hypoxic respiratory failure with hypoxia, possibly due to aspiration pneumonia  patient was saturating 88% as per ER documentation  Chest x-ray reported as streaky bilateral infrahilar airspace opacities which could represent an atypical or viral pneumonitis. Patient also had low-grade fever at 100.6. Continue oxygen supplementation and wean as tolerated. Continue abx for pneumonia. Respiratory panel negative. Urine Legionella, Streptococcus pneumonia antigen ordered. Sputum culture ordered. Blood cultures ordered in the ER. Consult pulmonology as needed.  -On 2 L nasal cannula. Breathing appears stable.     2. Accidental drug overdose:  Patient does use IV heroin and cocaine. Patient currently on Narcan drip. - Appears improved. Stop Narcan drip.     3.  Epistaxis: Resolved      DVT Prophylaxis: Lovenox  Diet: Diet NPO Effective Now  Home O2: None  Code Status: Full Code      Dispo:  -Patient is planning on leaving [place].     Masha Chao MD  3/11/2020    Meds:   Meds:    sodium chloride flush  10 mL Intravenous 2 times per day    enoxaparin  40 mg Subcutaneous Daily    vancomycin 1,250 mg Intravenous Q12H    cefepime  2 g Intravenous Q8H      Infusions:    naloxone (NARCAN) infusion 1 mg/hr (03/11/20 0701)    sodium chloride 125 mL/hr at 03/11/20 0554     PRN Meds: sodium chloride flush, 10 mL, PRN  acetaminophen, 650 mg, Q6H PRN    Or  acetaminophen, 650 mg, Q6H PRN  polyethylene glycol, 17 g, Daily PRN  promethazine, 12.5 mg, Q6H PRN    Or  ondansetron, 4 mg, Q6H PRN        Data/Labs:     Recent Labs     03/10/20  2320 03/11/20  0819   WBC 8.5 8.8   HGB 14.0 12.7*   HCT 42.2 39.0*    167      Recent Labs     03/10/20  2320   *   K 4.0   CL 99   CO2 23   BUN 15   CREATININE 1.2     Recent Labs     03/10/20  2320   AST 27   ALT 14   BILITOT 0.2   ALKPHOS 43     No results for input(s): INR in the last 72 hours. Recent Labs     03/10/20  2320   TROPONINT <0.010     HgBA1c: No results found for: LABA1C  CALCIUM:  9.1/23 (03/10 2320)    No intake/output data recorded.   No intake or output data in the 24 hours ending 03/11/20 0917

## 2020-03-12 LAB
CULTURE: NORMAL
LEGIONELLA URINARY AG: NEGATIVE
Lab: NORMAL
SPECIMEN: NORMAL

## 2020-03-13 LAB
EKG ATRIAL RATE: 105 BPM
EKG DIAGNOSIS: NORMAL
EKG P AXIS: 65 DEGREES
EKG P-R INTERVAL: 158 MS
EKG Q-T INTERVAL: 342 MS
EKG QRS DURATION: 96 MS
EKG QTC CALCULATION (BAZETT): 452 MS
EKG R AXIS: 73 DEGREES
EKG T AXIS: 34 DEGREES
EKG VENTRICULAR RATE: 105 BPM

## 2020-03-16 LAB
CULTURE: NORMAL
Lab: NORMAL
SPECIMEN: NORMAL

## 2020-03-20 NOTE — DISCHARGE SUMMARY
Hospital Medicine  DISCHARGE SUMMARY  Date: 3/19/2020  Name: Kitty Escalera  MRN: [id number]  YOB: 1990     Patient's PCP: No primary care provider on file. Admit Date: 3/10/2020   Discharge Date: 3/11/2022  Admitting Physician: Megan Sanchez MD  Discharge Physician: Hector Gongora MD      Discharge Diagnoses:  1. Acute hypoxic respiratory failure with hypoxia, possibly due to aspiration pneumonia   2.  Accidental drug overdose  3.  Epistaxis    HPI:    Chief Complaint   Patient presents with    Drug Overdose     6mg IV and 2mg IM narcan given      Kitty Escalera is a 30 y.o. male with no significant past medical history, polysubstance abuse was brought to ER by EMS after the patient was found by his girlfriend. Patient was drowsy, unable to get much information from him. Patient knew that he was in the hospital, name of the hospital, year, month, date. Entire information was from patient's girlfriend at bedside. She states that she found the patient with agonal breathing when she returned from work. She said that the patient was using cocaine and heroin. She reports that he was doing heroin in the past and she was not aware that he started using it again. Patient had multiple incidents of overdose in the past.  Patient currently denied any complaints-no chest pain, shortness of breath, abdominal pain, denied any diarrhea. Patient was noted to be saturating 88% and was placed on supplemental oxygen. Patient was saturating 99% on 4 L of oxygen at the time of my interview. Patient received 2 mg of IM, 6 mg of IV Narcan by EMS with improvement in his mental status. Patient's girlfriend also stated that patient was having cough with productive sputum. At presentation patient was noted to have /106, , RR 20, temperature 100.6, saturating 94%. As per ER physician patient was requiring frequent doses of Narcan and hence a continuous infusion was started.   Patient was also noted to have epistaxis in the ER which resolved. Labs within normal limits. Chest x-ray-reported as streaky bilateral infrahilar airspace opacities, which could represent an atypical or viral pneumonitis. Patient received vancomycin, cefepime, azithromycin. Hospital Course  Patient came in due to a drug overdose. Patient was placed on a Narcan drip. He has a history of using IV heroin and cocaine. Patient had acute hypoxic respiratory failure. There was concern for possible aspiration pneumonia. Chest x-ray showed streaky bilateral infrahilar airspace opacities. He also had a low-grade fever. He was on oxygen. He was started on antibiotics. His respiratory panel was negative. Patient appeared to improve. He was able to come off the Narcan drip. His breathing was stable on 2 L nasal cannula. Patient had epistaxis which resolved. Patient was stable and he left AMA. Physical Exam:  BP (!) 130/92   Pulse 71   Temp 98.6 °F (37 °C) (Oral)   Resp 20   Ht 5' 7\" (1.702 m)   Wt 159 lb 6.3 oz (72.3 kg)   SpO2 100%   BMI 24.96 kg/m²   CONSTITUTIONAL:  No acute distress, sleeping, can be aroused  HENT:  NCAT  LUNGS:  cta b/l bs+  CARDIOVASCULAR:  s1s2 rrr  ABDOMEN:  Soft nt nd  MUSCULOSKELETAL/Extremities:  No edema, nontender  NEUROLOGIC:  Able to be arouse, but quickly falls asleep  SKIN:  May have track yony in right arm    Consultations  IP CONSULT TO HOSPITALIST  IP CONSULT TO PHARMACY    Invasive procedures:   none    Significant Diagnostic Studies:    Imaging  Xr Chest Portable  Result Date: 3/11/2020  Streaky, bilateral infrahilar airspace opacities, which could represent an atypical or viral pneumonitis. Follow-up recommended to assure resolution.        Code Status:  Full      Discharge Medications:  Patient Left AMA    Patient Instructions:  Patient Left Evelyn Segundo MD

## 2022-03-06 ENCOUNTER — HOSPITAL ENCOUNTER (EMERGENCY)
Age: 32
Discharge: HOME | End: 2022-03-06
Payer: COMMERCIAL

## 2022-03-06 VITALS
OXYGEN SATURATION: 99 % | SYSTOLIC BLOOD PRESSURE: 114 MMHG | TEMPERATURE: 98.3 F | DIASTOLIC BLOOD PRESSURE: 70 MMHG | HEART RATE: 80 BPM | RESPIRATION RATE: 20 BRPM

## 2022-03-06 DIAGNOSIS — L05.91 PILONIDAL CYST: Primary | ICD-10-CM

## 2022-03-06 PROCEDURE — 99284 EMERGENCY DEPT VISIT MOD MDM: CPT

## 2022-03-06 PROCEDURE — 10081 I&D PILONIDAL CYST COMP: CPT

## 2022-03-06 PROCEDURE — 2500000003 HC RX 250 WO HCPCS: Performed by: PHYSICIAN ASSISTANT

## 2022-03-06 PROCEDURE — 6370000000 HC RX 637 (ALT 250 FOR IP): Performed by: PHYSICIAN ASSISTANT

## 2022-03-06 RX ORDER — IBUPROFEN 600 MG/1
600 TABLET ORAL ONCE
Status: COMPLETED | OUTPATIENT
Start: 2022-03-06 | End: 2022-03-06

## 2022-03-06 RX ORDER — DOXYCYCLINE HYCLATE 100 MG
100 TABLET ORAL EVERY 12 HOURS SCHEDULED
Status: DISCONTINUED | OUTPATIENT
Start: 2022-03-06 | End: 2022-03-06 | Stop reason: HOSPADM

## 2022-03-06 RX ORDER — DOXYCYCLINE HYCLATE 100 MG
100 TABLET ORAL 2 TIMES DAILY
Qty: 20 TABLET | Refills: 0 | Status: SHIPPED | OUTPATIENT
Start: 2022-03-06 | End: 2022-03-16

## 2022-03-06 RX ORDER — HYDROCODONE BITARTRATE AND ACETAMINOPHEN 5; 325 MG/1; MG/1
1 TABLET ORAL ONCE
Status: COMPLETED | OUTPATIENT
Start: 2022-03-06 | End: 2022-03-06

## 2022-03-06 RX ORDER — LIDOCAINE HYDROCHLORIDE AND EPINEPHRINE BITARTRATE 20; .01 MG/ML; MG/ML
20 INJECTION, SOLUTION SUBCUTANEOUS ONCE
Status: COMPLETED | OUTPATIENT
Start: 2022-03-06 | End: 2022-03-06

## 2022-03-06 RX ADMIN — LIDOCAINE HYDROCHLORIDE,EPINEPHRINE BITARTRATE 20 ML: 20; .01 INJECTION, SOLUTION INFILTRATION; PERINEURAL at 18:29

## 2022-03-06 RX ADMIN — DOXYCYCLINE HYCLATE 100 MG: 100 TABLET, COATED ORAL at 19:40

## 2022-03-06 RX ADMIN — IBUPROFEN 600 MG: 600 TABLET ORAL at 18:29

## 2022-03-06 RX ADMIN — HYDROCODONE BITARTRATE AND ACETAMINOPHEN 1 TABLET: 5; 325 TABLET ORAL at 18:29

## 2022-03-06 ASSESSMENT — PAIN SCALES - GENERAL
PAINLEVEL_OUTOF10: 6
PAINLEVEL_OUTOF10: 8

## 2022-03-06 NOTE — ED PROVIDER NOTES
Not on file    Frequency of Social Gatherings with Friends and Family: Not on file    Attends Judaism Services: Not on file    Active Member of Clubs or Organizations: Not on file    Attends Club or Organization Meetings: Not on file    Marital Status: Not on file   Intimate Partner Violence:     Fear of Current or Ex-Partner: Not on file    Emotionally Abused: Not on file    Physically Abused: Not on file    Sexually Abused: Not on file   Housing Stability:     Unable to Pay for Housing in the Last Year: Not on file    Number of [place] in the Last Year: Not on file    Unstable Housing in the Last Year: Not on file     Current Facility-Administered Medications   Medication Dose Route Frequency Provider Last Rate Last Admin    doxycycline hyclate (VIBRA-TABS) tablet 100 mg  100 mg Oral 2 times per day Lianet Kendall PA-C         Current Outpatient Medications   Medication Sig Dispense Refill    doxycycline hyclate (VIBRA-TABS) 100 MG tablet Take 1 tablet by mouth 2 times daily for 10 days 20 tablet 0    ondansetron (ZOFRAN) 4 MG tablet Take 1 tablet by mouth every 8 hours as needed for Nausea or Vomiting 30 tablet 0     No Known Allergies    Nursing Notes Reviewed    Physical Exam:  ED Triage Vitals [03/06/22 1440]   Enc Vitals Group      /71      Pulse 96      Resp 16      Temp 98.3 °F (36.8 °C)      Temp Source Oral      SpO2 96 %      Weight       Height       Head Circumference       Peak Flow       Pain Score       Pain Loc       Pain Edu? Excl. in [address]? GENERAL APPEARANCE: Awake and alert. Cooperative. No acute distress. HEAD: Normocephalic. Atraumatic. EYES: EOM's grossly intact. Sclera anicteric. PERRLA. Conjunctiva non injected. No discharge  ENT: Mucous membranes are moist. No trismus. Posterior Pharynx non injected, no tongue swelling, airway patent, no tonsillar edema. No exudates noted. No abscess. No discharge. Middle ear spaces clear.  Tympanic membrane non injected. Auditory canal clear. NECK: Supple. No meningismus. No palpable masses. No lymphadenopathy. ABDOMEN: Soft. Non-tender. No guarding or rebound. No organomegaly. No palpable masses  MUSCULOSKELETAL: No acute deformities. SKIN: Warm and dry. No rash, No erythema, No edema. No ecchymoses. Quarter size area of induration and erythema noted just at the very top of patient's gluteal cleft. Tenderness palpation noted. No fluctuance no punctum. No crepitus. No streaking. NEUROLOGICAL: No gross facial drooping. Moves all 4 extremities spontaneously. PSYCHIATRIC: Normal mood. Procedure Note - Incision and Drainage: The risks and benefits of incision and drainage were discussed with the patient. Questions were sought and answered and verbal consent was given for the procedure. The area was prepped and draped in a standard bedside fashion. The wound area was anesthetized with 5ml of Lidocaine 2% with epinephrine without added sodium bicarbonate. An 11 blade surgical scalpel was used to make a 1cm cruciate incision over the area of maximum fluctulance. Curved hemostats were then used to break up loculations. The wound was irrigated and 1/4\" packing was placed. The patient tolerated the procedure well without complications. Abscess and wound care education was provided. Instructions were given to return for increasing pain, redness or any other worsening or worrisome concerns. I have reviewed and interpreted all of the currently available lab results from this visit (if applicable):  No results found for this visit on 03/06/22. Radiographs (if obtained):  [] The following radiograph was interpreted by myself in the absence of a radiologist:   [] Radiologist's Report Reviewed:  No orders to display       EKG (if obtained):   Please See Note of attending physician for EKG interpretation. Chart review shows recent radiograph(s):  No results found.     MDM:   Patient presents today with abscess. Incision and drainage is performed by myself. No complications. Patient did tolerate procedure well. Patient will be discharged home with analgesia. Patient will be discharged with antibiotics. No sign of cellulitis, Masood Pretzel syndrome, necrotizing fasciitis, other. Pt is to be discharged home. Pt is  to return immediately to the emergency department if he has any new, worrisome or worsening symptoms. Pt is to follow up with PCP within 2 days. Patient/Surrogate vocalizes agreement and understanding with this plan and he has no questions upon disposition. Pt is comfortable upon disposition home. Patient is stable, Patients vital signs are stable. Vital signs and nursing notes reviewed during ED course. I independently managed patient today in the ED. All pertinent Lab data and radiographic results reviewed with patient at bedside. The patient and/or the family were informed of the results of any tests/labs/imaging, the treatment plan, and time was allotted to answer questions. See chart for details of medications given during the ED stay. /71   Pulse 96   Temp 98.3 °F (36.8 °C) (Oral)   Resp 16   SpO2 96%       Clinical Impression:  1.  Pilonidal cyst        Disposition referral (if applicable):  [facility] Emergency Department  [address]  [phone]  In 2 days  For wound re-check    Boston Parr, DO  [address]  [phone]    In 1 day      Disposition medications (if applicable):  New Prescriptions    DOXYCYCLINE HYCLATE (VIBRA-TABS) 100 MG TABLET    Take 1 tablet by mouth 2 times daily for 10 days       (Please note that portions of this note may have been completed with a voice recognition program. Efforts were made to edit the dictations but occasionally words are mis-transcribed.)    Lucia Baldwin PA-C Comment: Please note this report has been produced using speech recognition software and may contain errors related to that system including errors in grammar, punctuation, and spelling, as well as words and phrases that may be inappropriate. If there are any questions or concerns please feel free to contact the dictating provider for clarification.         Nay Bernard PA-C  03/06/22 [address], PA-C  03/06/22 2893

## 2022-03-06 NOTE — ED PROVIDER NOTES
As provider-in-triage, I performed a medical screening history and physical exam on this patient. HISTORY OF PRESENT ILLNESS  Kimmy Reid is a 33 y.o. male presents with c/o pain and cyst on his tailbone. He states a history of an abscess that needed drained about 4-5 years ago while in [place]. Pain is 8/10, tender and constant. Sitting and standing exacerbates his symptoms. Nothing alleviates his symptoms. He states his pain began 2 days ago. He denies any fevers or drainage. PHYSICAL EXAM  /71   Pulse 96   Temp 98.3 °F (36.8 °C) (Oral)   Resp 16   SpO2 96%     On exam, the patient appears in no acute distress. Speech is clear. Breathing is unlabored. Moves all extremities    Comment: Please note this report has been produced using speech recognition software and may contain errors related to that system including errors in grammar, punctuation, and spelling, as well as words and phrases that may be inappropriate. If there are any questions or concerns please feel free to contact the dictating provider for clarification.      KATHERIN Castellano - FAVIO  03/06/22 2607

## 2023-11-07 ENCOUNTER — OFFICE VISIT (OUTPATIENT)
Dept: INTERNAL MEDICINE CLINIC | Age: 33
End: 2023-11-07
Payer: COMMERCIAL

## 2023-11-07 VITALS
OXYGEN SATURATION: 96 % | SYSTOLIC BLOOD PRESSURE: 134 MMHG | HEART RATE: 68 BPM | WEIGHT: 185.8 LBS | HEIGHT: 67 IN | RESPIRATION RATE: 18 BRPM | BODY MASS INDEX: 29.16 KG/M2 | TEMPERATURE: 98 F | DIASTOLIC BLOOD PRESSURE: 76 MMHG

## 2023-11-07 DIAGNOSIS — Z11.4 ENCOUNTER FOR SCREENING FOR HIV: ICD-10-CM

## 2023-11-07 DIAGNOSIS — R06.02 SOB (SHORTNESS OF BREATH): Primary | ICD-10-CM

## 2023-11-07 DIAGNOSIS — Z11.59 NEED FOR HEPATITIS C SCREENING TEST: ICD-10-CM

## 2023-11-07 DIAGNOSIS — Z13.220 LIPID SCREENING: ICD-10-CM

## 2023-11-07 DIAGNOSIS — Z72.0 TOBACCO ABUSE: ICD-10-CM

## 2023-11-07 PROBLEM — F19.11 H/O: SUBSTANCE ABUSE (HCC): Status: ACTIVE | Noted: 2023-11-07

## 2023-11-07 PROCEDURE — G8484 FLU IMMUNIZE NO ADMIN: HCPCS | Performed by: INTERNAL MEDICINE

## 2023-11-07 PROCEDURE — G8427 DOCREV CUR MEDS BY ELIG CLIN: HCPCS | Performed by: INTERNAL MEDICINE

## 2023-11-07 PROCEDURE — G8419 CALC BMI OUT NRM PARAM NOF/U: HCPCS | Performed by: INTERNAL MEDICINE

## 2023-11-07 PROCEDURE — 99203 OFFICE O/P NEW LOW 30 MIN: CPT | Performed by: INTERNAL MEDICINE

## 2023-11-07 PROCEDURE — 4004F PT TOBACCO SCREEN RCVD TLK: CPT | Performed by: INTERNAL MEDICINE

## 2023-11-07 SDOH — ECONOMIC STABILITY: INCOME INSECURITY: HOW HARD IS IT FOR YOU TO PAY FOR THE VERY BASICS LIKE FOOD, HOUSING, MEDICAL CARE, AND HEATING?: SOMEWHAT HARD

## 2023-11-07 SDOH — ECONOMIC STABILITY: HOUSING INSECURITY
IN THE LAST 12 MONTHS, WAS THERE A TIME WHEN YOU DID NOT HAVE A STEADY PLACE TO SLEEP OR SLEPT IN A SHELTER (INCLUDING NOW)?: NO

## 2023-11-07 SDOH — ECONOMIC STABILITY: FOOD INSECURITY: WITHIN THE PAST 12 MONTHS, THE FOOD YOU BOUGHT JUST DIDN'T LAST AND YOU DIDN'T HAVE MONEY TO GET MORE.: NEVER TRUE

## 2023-11-07 SDOH — ECONOMIC STABILITY: FOOD INSECURITY: WITHIN THE PAST 12 MONTHS, YOU WORRIED THAT YOUR FOOD WOULD RUN OUT BEFORE YOU GOT MONEY TO BUY MORE.: NEVER TRUE

## 2023-11-07 ASSESSMENT — ENCOUNTER SYMPTOMS
WHEEZING: 0
SHORTNESS OF BREATH: 1
ALLERGIC/IMMUNOLOGIC NEGATIVE: 1
BACK PAIN: 1
COUGH: 0
GASTROINTESTINAL NEGATIVE: 1
EYES NEGATIVE: 1

## 2023-11-07 ASSESSMENT — PATIENT HEALTH QUESTIONNAIRE - PHQ9
SUM OF ALL RESPONSES TO PHQ9 QUESTIONS 1 & 2: 1
SUM OF ALL RESPONSES TO PHQ QUESTIONS 1-9: 1
SUM OF ALL RESPONSES TO PHQ QUESTIONS 1-9: 1
1. LITTLE INTEREST OR PLEASURE IN DOING THINGS: 1
SUM OF ALL RESPONSES TO PHQ QUESTIONS 1-9: 1
2. FEELING DOWN, DEPRESSED OR HOPELESS: 0
SUM OF ALL RESPONSES TO PHQ QUESTIONS 1-9: 1

## 2023-12-06 ENCOUNTER — TELEPHONE (OUTPATIENT)
Dept: INTERNAL MEDICINE CLINIC | Age: 33
End: 2023-12-06